# Patient Record
Sex: FEMALE | URBAN - METROPOLITAN AREA
[De-identification: names, ages, dates, MRNs, and addresses within clinical notes are randomized per-mention and may not be internally consistent; named-entity substitution may affect disease eponyms.]

---

## 2022-04-28 ENCOUNTER — APPOINTMENT (RX ONLY)
Dept: URBAN - METROPOLITAN AREA CLINIC 113 | Facility: CLINIC | Age: 77
Setting detail: DERMATOLOGY
End: 2022-04-28

## 2022-04-28 DIAGNOSIS — L72.0 EPIDERMAL CYST: ICD-10-CM

## 2022-04-28 DIAGNOSIS — D69.2 OTHER NONTHROMBOCYTOPENIC PURPURA: ICD-10-CM

## 2022-04-28 DIAGNOSIS — L08.9 LOCAL INFECTION OF THE SKIN AND SUBCUTANEOUS TISSUE, UNSPECIFIED: ICD-10-CM

## 2022-04-28 PROCEDURE — ? ADDITIONAL NOTES

## 2022-04-28 PROCEDURE — ? PRESCRIPTION MEDICATION MANAGEMENT

## 2022-04-28 PROCEDURE — 99203 OFFICE O/P NEW LOW 30 MIN: CPT

## 2022-04-28 PROCEDURE — ? COUNSELING

## 2022-04-28 PROCEDURE — ? PRESCRIPTION

## 2022-04-28 PROCEDURE — ? DIAGNOSIS COMMENT

## 2022-04-28 PROCEDURE — ? TREATMENT REGIMEN

## 2022-04-28 RX ORDER — MUPIROCIN 20 MG/G
OINTMENT TOPICAL
Qty: 15 | Refills: 0 | Status: ERX | COMMUNITY
Start: 2022-04-28

## 2022-04-28 RX ADMIN — MUPIROCIN: 20 OINTMENT TOPICAL at 00:00

## 2022-04-28 ASSESSMENT — LOCATION DETAILED DESCRIPTION DERM
LOCATION DETAILED: RIGHT LATERAL INFERIOR EYELID
LOCATION DETAILED: RIGHT MEDIAL SUPERIOR EYELID
LOCATION DETAILED: LEFT CENTRAL EYEBROW
LOCATION DETAILED: LEFT LATERAL INFERIOR PRESEPTAL REGION
LOCATION DETAILED: LEFT PROXIMAL DORSAL FOREARM
LOCATION DETAILED: RIGHT PROXIMAL DORSAL FOREARM
LOCATION DETAILED: LEFT LATERAL SUPERIOR EYELID

## 2022-04-28 ASSESSMENT — LOCATION SIMPLE DESCRIPTION DERM
LOCATION SIMPLE: LEFT FOREARM
LOCATION SIMPLE: LEFT SUPERIOR EYELID
LOCATION SIMPLE: RIGHT SUPERIOR EYELID
LOCATION SIMPLE: LEFT INFERIOR EYELID
LOCATION SIMPLE: RIGHT INFERIOR EYELID
LOCATION SIMPLE: RIGHT FOREARM
LOCATION SIMPLE: LEFT EYEBROW

## 2022-04-28 ASSESSMENT — LOCATION ZONE DERM
LOCATION ZONE: EYELID
LOCATION ZONE: ARM
LOCATION ZONE: FACE

## 2022-04-28 NOTE — PROCEDURE: DIAGNOSIS COMMENT
Comment: Secondary to patient ‘picking and digging’ at milia on eyelids. Strongly discouraged picking as it can lead to an infection, which in this case it has done. We will treat infection as directed and follow up as scheduled. If infection is cleared at follow up, discussed that I will treat any milia on exam.
Render Risk Assessment In Note?: no
Detail Level: Zone

## 2022-04-28 NOTE — PROCEDURE: ADDITIONAL NOTES
Additional Notes: Plan to extract when infection is clear
Detail Level: Simple
Render Risk Assessment In Note?: no

## 2022-04-28 NOTE — PROCEDURE: PRESCRIPTION MEDICATION MANAGEMENT
Detail Level: Zone
Render In Strict Bullet Format?: No
Initiate Treatment: -\\n- mupirocin 2% ointment: apply to affected areas twice daily for 2 weeks\\n- apply plain OTC Vaseline nightly before bed

## 2022-05-13 ENCOUNTER — APPOINTMENT (RX ONLY)
Dept: URBAN - METROPOLITAN AREA CLINIC 113 | Facility: CLINIC | Age: 77
Setting detail: DERMATOLOGY
End: 2022-05-13

## 2022-05-13 DIAGNOSIS — L72.0 EPIDERMAL CYST: ICD-10-CM | Status: RESOLVED

## 2022-05-13 DIAGNOSIS — L08.9 LOCAL INFECTION OF THE SKIN AND SUBCUTANEOUS TISSUE, UNSPECIFIED: ICD-10-CM | Status: IMPROVED

## 2022-05-13 PROCEDURE — 99213 OFFICE O/P EST LOW 20 MIN: CPT

## 2022-05-13 PROCEDURE — ? COUNSELING

## 2022-05-13 PROCEDURE — ? PRESCRIPTION

## 2022-05-13 PROCEDURE — ? PRESCRIPTION MEDICATION MANAGEMENT

## 2022-05-13 PROCEDURE — ? DIAGNOSIS COMMENT

## 2022-05-13 RX ORDER — MUPIROCIN 20 MG/G
OINTMENT TOPICAL
Qty: 15 | Refills: 0 | Status: ERX

## 2022-05-13 ASSESSMENT — LOCATION DETAILED DESCRIPTION DERM
LOCATION DETAILED: LEFT CENTRAL EYEBROW
LOCATION DETAILED: RIGHT MEDIAL SUPERIOR EYELID
LOCATION DETAILED: LEFT LATERAL INFERIOR PRESEPTAL REGION
LOCATION DETAILED: LEFT LATERAL SUPERIOR EYELID
LOCATION DETAILED: RIGHT LATERAL INFERIOR EYELID

## 2022-05-13 ASSESSMENT — LOCATION SIMPLE DESCRIPTION DERM
LOCATION SIMPLE: LEFT SUPERIOR EYELID
LOCATION SIMPLE: LEFT INFERIOR EYELID
LOCATION SIMPLE: RIGHT SUPERIOR EYELID
LOCATION SIMPLE: LEFT EYEBROW
LOCATION SIMPLE: RIGHT INFERIOR EYELID

## 2022-05-13 ASSESSMENT — LOCATION ZONE DERM
LOCATION ZONE: FACE
LOCATION ZONE: EYELID

## 2022-05-13 NOTE — PROCEDURE: PRESCRIPTION MEDICATION MANAGEMENT
Detail Level: Zone
Render In Strict Bullet Format?: No
Continue Regimen: -\\n- mupirocin 2% ointment: apply to affected areas twice daily for additional 2 weeks\\n- apply plain OTC Vaseline nightly before bed

## 2022-07-21 ENCOUNTER — APPOINTMENT (RX ONLY)
Dept: URBAN - METROPOLITAN AREA CLINIC 111 | Facility: CLINIC | Age: 77
Setting detail: DERMATOLOGY
End: 2022-07-21

## 2022-07-21 DIAGNOSIS — L83 ACANTHOSIS NIGRICANS: ICD-10-CM

## 2022-07-21 DIAGNOSIS — H02.40 UNSPECIFIED PTOSIS OF EYELID: ICD-10-CM

## 2022-07-21 DIAGNOSIS — L72.0 EPIDERMAL CYST: ICD-10-CM

## 2022-07-21 PROBLEM — H02.403 UNSPECIFIED PTOSIS OF BILATERAL EYELIDS: Status: ACTIVE | Noted: 2022-07-21

## 2022-07-21 PROCEDURE — ? DIAGNOSIS COMMENT

## 2022-07-21 PROCEDURE — 99213 OFFICE O/P EST LOW 20 MIN: CPT

## 2022-07-21 PROCEDURE — ? TREATMENT REGIMEN

## 2022-07-21 PROCEDURE — ? COUNSELING

## 2022-07-21 ASSESSMENT — LOCATION SIMPLE DESCRIPTION DERM
LOCATION SIMPLE: LEFT INFERIOR EYELID
LOCATION SIMPLE: LEFT EYEBROW
LOCATION SIMPLE: LEFT CHEEK
LOCATION SIMPLE: RIGHT INFERIOR EYELID
LOCATION SIMPLE: RIGHT CHEEK
LOCATION SIMPLE: RIGHT EYEBROW
LOCATION SIMPLE: SCALP

## 2022-07-21 ASSESSMENT — LOCATION DETAILED DESCRIPTION DERM
LOCATION DETAILED: LEFT CENTRAL EYEBROW
LOCATION DETAILED: RIGHT LATERAL INFERIOR EYELID
LOCATION DETAILED: LEFT SUPERIOR MEDIAL MALAR CHEEK
LOCATION DETAILED: RIGHT CENTRAL MALAR CHEEK
LOCATION DETAILED: LEFT LATERAL INFERIOR EYELID
LOCATION DETAILED: RIGHT CENTRAL EYEBROW
LOCATION DETAILED: LEFT CENTRAL OCCIPITAL SCALP

## 2022-07-21 ASSESSMENT — LOCATION ZONE DERM
LOCATION ZONE: SCALP
LOCATION ZONE: FACE
LOCATION ZONE: EYELID

## 2022-07-21 NOTE — PROCEDURE: TREATMENT REGIMEN
Detail Level: Zone
Plan: Recommended patient to see Dr.Sean Romero with [facility] ([phone]) for a blepharoplasty, this can help with the discoloration and drooping.

## 2022-07-21 NOTE — PROCEDURE: DIAGNOSIS COMMENT
Render Risk Assessment In Note?: no
Comment: Advised patient to stop using the magnifying mirror.
Detail Level: Simple

## 2023-01-18 ENCOUNTER — APPOINTMENT (RX ONLY)
Dept: URBAN - METROPOLITAN AREA CLINIC 111 | Facility: CLINIC | Age: 78
Setting detail: DERMATOLOGY
End: 2023-01-18

## 2023-01-18 DIAGNOSIS — H01.13 ECZEMATOUS DERMATITIS OF EYELID: ICD-10-CM

## 2023-01-18 DIAGNOSIS — Z41.9 ENCOUNTER FOR PROCEDURE FOR PURPOSES OTHER THAN REMEDYING HEALTH STATE, UNSPECIFIED: ICD-10-CM

## 2023-01-18 DIAGNOSIS — L70.8 OTHER ACNE: ICD-10-CM

## 2023-01-18 PROBLEM — H01.139 ECZEMATOUS DERMATITIS OF UNSPECIFIED EYE, UNSPECIFIED EYELID: Status: ACTIVE | Noted: 2023-01-18

## 2023-01-18 PROCEDURE — ? PHOTO-DOCUMENTATION

## 2023-01-18 PROCEDURE — ? COUNSELING

## 2023-01-18 PROCEDURE — 99214 OFFICE O/P EST MOD 30 MIN: CPT

## 2023-01-18 PROCEDURE — ? PRESCRIPTION MEDICATION MANAGEMENT

## 2023-01-18 PROCEDURE — ? DIAGNOSIS COMMENT

## 2023-01-18 PROCEDURE — ? PRESCRIPTION

## 2023-01-18 RX ORDER — TACROLIMUS 1 MG/G
OINTMENT TOPICAL
Qty: 30 | Refills: 1 | Status: ERX | COMMUNITY
Start: 2023-01-18

## 2023-01-18 RX ORDER — TRETIONIN 0.25 MG/G
CREAM TOPICAL
Qty: 20 | Refills: 3 | Status: ERX | COMMUNITY
Start: 2023-01-18

## 2023-01-18 RX ADMIN — TACROLIMUS: 1 OINTMENT TOPICAL at 00:00

## 2023-01-18 RX ADMIN — TRETIONIN: 0.25 CREAM TOPICAL at 00:00

## 2023-01-18 ASSESSMENT — LOCATION DETAILED DESCRIPTION DERM
LOCATION DETAILED: LEFT CENTRAL EYEBROW
LOCATION DETAILED: RIGHT INFERIOR MEDIAL FOREHEAD

## 2023-01-18 ASSESSMENT — LOCATION SIMPLE DESCRIPTION DERM
LOCATION SIMPLE: RIGHT FOREHEAD
LOCATION SIMPLE: LEFT EYEBROW

## 2023-01-18 ASSESSMENT — LOCATION ZONE DERM: LOCATION ZONE: FACE

## 2023-01-18 NOTE — PROCEDURE: PRESCRIPTION MEDICATION MANAGEMENT
Detail Level: Zone
Initiate Treatment: -\\n\\nCerave Hydrating Cleanser \\n\\nTretinoin 0.025 % topical cream : Apply a pea-sized amount in a thin layer to entire face nightly. Can start every 3rd night and increase to nightly as tolerated.\\n\\nMoisturize daily with Cerave moisturizing cream
Render In Strict Bullet Format?: Yes
Initiate Treatment: -\\n\\nTacrolimus 0.1 % topical ointment : Apply to affected areas twice daily as needed for flares. May mix with CeraVe Healing Ointment or refrigerate if stinging occurs upon application.
Discontinue Regimen: -
Plan: Follow up in a month to re evaluate for milia

## 2023-01-18 NOTE — HPI: SKIN LESION
Is This A New Presentation, Or A Follow-Up?: Skin Lesions
What Type Of Note Output Would You Prefer (Optional)?: Bullet Format
How Severe Is Your Skin Lesion?: moderate
Additional History: \\n\\n\\nPatient would like treatment options for milia, she has only tried an OTC extraction which was not effective. She also notes emergence of new fine lines since trying to do extractions hersel

## 2023-01-18 NOTE — PROCEDURE: DIAGNOSIS COMMENT
Comment: Recommended Skin Better Eyemax for around the eyes, patient can begin to use after rash calms down.
Detail Level: Simple
Render Risk Assessment In Note?: no

## 2023-02-16 ENCOUNTER — APPOINTMENT (RX ONLY)
Dept: URBAN - METROPOLITAN AREA CLINIC 111 | Facility: CLINIC | Age: 78
Setting detail: DERMATOLOGY
End: 2023-02-16

## 2023-02-16 DIAGNOSIS — H01.13 ECZEMATOUS DERMATITIS OF EYELID: ICD-10-CM

## 2023-02-16 PROBLEM — H01.139 ECZEMATOUS DERMATITIS OF UNSPECIFIED EYE, UNSPECIFIED EYELID: Status: ACTIVE | Noted: 2023-02-16

## 2023-02-16 PROCEDURE — ? COUNSELING

## 2023-02-16 PROCEDURE — 99213 OFFICE O/P EST LOW 20 MIN: CPT

## 2023-02-16 PROCEDURE — ? PRESCRIPTION MEDICATION MANAGEMENT

## 2023-02-16 ASSESSMENT — LOCATION SIMPLE DESCRIPTION DERM: LOCATION SIMPLE: LEFT EYEBROW

## 2023-02-16 ASSESSMENT — LOCATION DETAILED DESCRIPTION DERM: LOCATION DETAILED: LEFT CENTRAL EYEBROW

## 2023-02-16 ASSESSMENT — LOCATION ZONE DERM: LOCATION ZONE: FACE

## 2023-02-16 NOTE — PROCEDURE: PRESCRIPTION MEDICATION MANAGEMENT
Continue Regimen: -\\n\\nTacrolimus 0.1 % topical ointment : Apply to affected areas twice daily as needed for flares. May mix with CeraVe Healing Ointment or refrigerate if stinging occurs upon application.
Discontinue Regimen: — Retinal on eyelids
Plan: - discussed Patch testing if this does not resolve completely
Render In Strict Bullet Format?: Yes
Detail Level: Zone
no concerns

## 2024-06-18 ENCOUNTER — APPOINTMENT (RX ONLY)
Dept: URBAN - METROPOLITAN AREA CLINIC 111 | Facility: CLINIC | Age: 79
Setting detail: DERMATOLOGY
End: 2024-06-18

## 2024-06-18 DIAGNOSIS — L72.0 EPIDERMAL CYST: ICD-10-CM

## 2024-06-18 DIAGNOSIS — L259 CONTACT DERMATITIS AND OTHER ECZEMA, UNSPECIFIED CAUSE: ICD-10-CM | Status: INADEQUATELY CONTROLLED

## 2024-06-18 PROBLEM — L23.9 ALLERGIC CONTACT DERMATITIS, UNSPECIFIED CAUSE: Status: ACTIVE | Noted: 2024-06-18

## 2024-06-18 PROCEDURE — ? PRESCRIPTION MEDICATION MANAGEMENT

## 2024-06-18 PROCEDURE — ? PRESCRIPTION

## 2024-06-18 PROCEDURE — ? COUNSELING

## 2024-06-18 PROCEDURE — ? DIAGNOSIS COMMENT

## 2024-06-18 PROCEDURE — 99214 OFFICE O/P EST MOD 30 MIN: CPT

## 2024-06-18 RX ORDER — METHYLPREDNISOLONE 4 MG/1
TABLET ORAL
Qty: 1 | Refills: 0 | Status: ERX | COMMUNITY
Start: 2024-06-18

## 2024-06-18 RX ORDER — HYDROCORTISONE 25 MG/G
CREAM TOPICAL
Qty: 30 | Refills: 0 | Status: ERX | COMMUNITY
Start: 2024-06-18

## 2024-06-18 RX ADMIN — METHYLPREDNISOLONE: 4 TABLET ORAL at 00:00

## 2024-06-18 RX ADMIN — HYDROCORTISONE: 25 CREAM TOPICAL at 00:00

## 2024-06-18 ASSESSMENT — LOCATION DETAILED DESCRIPTION DERM
LOCATION DETAILED: LEFT INFERIOR CENTRAL MALAR CHEEK
LOCATION DETAILED: LEFT CENTRAL MALAR CHEEK
LOCATION DETAILED: LEFT INFERIOR ANTERIOR NECK
LOCATION DETAILED: RIGHT INFERIOR MEDIAL MALAR CHEEK
LOCATION DETAILED: RIGHT MEDIAL MALAR CHEEK

## 2024-06-18 ASSESSMENT — LOCATION SIMPLE DESCRIPTION DERM
LOCATION SIMPLE: LEFT ANTERIOR NECK
LOCATION SIMPLE: LEFT CHEEK
LOCATION SIMPLE: RIGHT CHEEK

## 2024-06-18 ASSESSMENT — LOCATION ZONE DERM
LOCATION ZONE: NECK
LOCATION ZONE: FACE

## 2024-06-18 NOTE — PROCEDURE: DIAGNOSIS COMMENT
Render Risk Assessment In Note?: no
Detail Level: Simple
Comment: Recommend extraction with Darya.
Comment: Likely allergic reaction to a new product.

## 2024-06-18 NOTE — PROCEDURE: PRESCRIPTION MEDICATION MANAGEMENT
Render In Strict Bullet Format?: Yes
Detail Level: Zone
Initiate Treatment: -\\n- was face twice daily with CeraVe gentle cleanser \\n- apply CeraVe healing ointment at night\\n- OTC Benadryl nightly\\n- hydrocortisone 2.5 % topical cream: Apply to affected areas on face and neck twice daily for 2 weeks on, 1 week off. Repeat prn for flares.\\n- Medrol (Jose) 4 mg tablets in a dose pack: Take per package instructions.

## 2024-07-25 ENCOUNTER — APPOINTMENT (RX ONLY)
Dept: URBAN - METROPOLITAN AREA CLINIC 111 | Facility: CLINIC | Age: 79
Setting detail: DERMATOLOGY
End: 2024-07-25

## 2024-07-25 DIAGNOSIS — L259 CONTACT DERMATITIS AND OTHER ECZEMA, UNSPECIFIED CAUSE: ICD-10-CM | Status: IMPROVED

## 2024-07-25 PROBLEM — L23.9 ALLERGIC CONTACT DERMATITIS, UNSPECIFIED CAUSE: Status: ACTIVE | Noted: 2024-07-25

## 2024-07-25 PROCEDURE — ? COUNSELING

## 2024-07-25 PROCEDURE — 99213 OFFICE O/P EST LOW 20 MIN: CPT

## 2024-07-25 PROCEDURE — ? PRESCRIPTION MEDICATION MANAGEMENT

## 2024-07-25 PROCEDURE — ? DIAGNOSIS COMMENT

## 2024-07-25 ASSESSMENT — LOCATION DETAILED DESCRIPTION DERM
LOCATION DETAILED: LEFT INFERIOR ANTERIOR NECK
LOCATION DETAILED: LEFT INFERIOR CENTRAL MALAR CHEEK
LOCATION DETAILED: RIGHT INFERIOR MEDIAL MALAR CHEEK

## 2024-07-25 ASSESSMENT — LOCATION ZONE DERM
LOCATION ZONE: NECK
LOCATION ZONE: FACE

## 2024-07-25 ASSESSMENT — LOCATION SIMPLE DESCRIPTION DERM
LOCATION SIMPLE: LEFT ANTERIOR NECK
LOCATION SIMPLE: RIGHT CHEEK
LOCATION SIMPLE: LEFT CHEEK

## 2024-07-25 NOTE — PROCEDURE: PRESCRIPTION MEDICATION MANAGEMENT
Render In Strict Bullet Format?: Yes
Continue Regimen: -\\n- was face twice daily with CeraVe gentle cleanser \\n- apply CeraVe healing ointment at night\\n- OTC Benadryl nightly\\n- hydrocortisone 2.5 % topical cream: Apply to affected areas on face and neck twice daily for 2 weeks on, 1 week off. Repeat prn for flares.\\n- Medrol (Jose) 4 mg tablets in a dose pack: Take per package instructions.
Detail Level: Zone

## 2024-07-25 NOTE — PROCEDURE: DIAGNOSIS COMMENT
Render Risk Assessment In Note?: no
Comment: Patient present with no flaring today. Offered patient to do patch testing in the event that she begins to flare.
Detail Level: Simple

## 2024-08-22 ENCOUNTER — APPOINTMENT (RX ONLY)
Dept: URBAN - METROPOLITAN AREA CLINIC 111 | Facility: CLINIC | Age: 79
Setting detail: DERMATOLOGY
End: 2024-08-22

## 2024-08-22 DIAGNOSIS — Z41.9 ENCOUNTER FOR PROCEDURE FOR PURPOSES OTHER THAN REMEDYING HEALTH STATE, UNSPECIFIED: ICD-10-CM

## 2024-08-22 PROCEDURE — ? DIAGNOSIS COMMENT

## 2024-08-22 NOTE — PROCEDURE: DIAGNOSIS COMMENT
Comment: Patient made appointment for milia extractions but she had no visible milia.  Her skin was peeling and extremely dry due to over use of Adapalene.  She stopped using it a few nights prior.  Instructed patient to use gentle cleansers and CeraVe moisturizer and to avoid anything harsh until skin is healed.  \\nPatient said she had patch testing at a different location in the past and she will bring us her list of allergens.
Render Risk Assessment In Note?: no
Detail Level: Simple

## 2024-09-06 ENCOUNTER — APPOINTMENT (RX ONLY)
Dept: URBAN - METROPOLITAN AREA CLINIC 111 | Facility: CLINIC | Age: 79
Setting detail: DERMATOLOGY
End: 2024-09-06

## 2024-09-06 DIAGNOSIS — L259 CONTACT DERMATITIS AND OTHER ECZEMA, UNSPECIFIED CAUSE: ICD-10-CM

## 2024-09-06 PROBLEM — L23.9 ALLERGIC CONTACT DERMATITIS, UNSPECIFIED CAUSE: Status: ACTIVE | Noted: 2024-09-06

## 2024-09-06 PROCEDURE — ? VISIT COMPLEXITY

## 2024-09-06 PROCEDURE — ? PRESCRIPTION MEDICATION MANAGEMENT

## 2024-09-06 PROCEDURE — ? COUNSELING

## 2024-09-06 PROCEDURE — ? DIAGNOSIS COMMENT

## 2024-09-06 PROCEDURE — 99213 OFFICE O/P EST LOW 20 MIN: CPT | Mod: 25

## 2024-09-06 PROCEDURE — ? PATCH TESTING

## 2024-09-06 PROCEDURE — 95044 PATCH/APPLICATION TESTS: CPT

## 2024-09-06 PROCEDURE — ? SEPARATE AND IDENTIFIABLE DOCUMENTATION

## 2024-09-06 ASSESSMENT — LOCATION ZONE DERM
LOCATION ZONE: NECK
LOCATION ZONE: FACE

## 2024-09-06 ASSESSMENT — LOCATION SIMPLE DESCRIPTION DERM
LOCATION SIMPLE: RIGHT CHEEK
LOCATION SIMPLE: LEFT ANTERIOR NECK
LOCATION SIMPLE: LEFT CHEEK

## 2024-09-06 ASSESSMENT — BSA RASH: BSA RASH: 10

## 2024-09-06 ASSESSMENT — SEVERITY ASSESSMENT: SEVERITY: SEVERE

## 2024-09-06 ASSESSMENT — LOCATION DETAILED DESCRIPTION DERM
LOCATION DETAILED: RIGHT INFERIOR MEDIAL MALAR CHEEK
LOCATION DETAILED: LEFT INFERIOR ANTERIOR NECK
LOCATION DETAILED: LEFT INFERIOR CENTRAL MALAR CHEEK

## 2024-09-06 NOTE — PROCEDURE: DIAGNOSIS COMMENT
Render Risk Assessment In Note?: no
Comment: Patient continues to flare and rash is c/w contact dermatitis. Recommended patch testing. Applying today and will have first reading Monday with LD and second reading Wednesday with AAM.
Detail Level: Simple

## 2024-09-06 NOTE — PROCEDURE: PATCH TESTING
Detail Level: Zone
Consent: Written consent obtained, risks reviewed including but not limited to rash, itching, allergic reaction, systemic rash, remote possiblity of anaphylaxis to allergen.
Post-Care Instructions: PATIENT INSTRUCTIONS\\nPatch Testing\\n\\nPatch Testing Process\\n    • You are being patch tested to determine if your rash is caused by an allergic reaction to specific substances (allergens such as chemicals, minerals, or medications) that may have come into contact with your skin. \\n    • We have tested the most common allergens in [place]; since the test cannot be comprehensive, not everyone will get conclusive results. \\n    • The test process will involve at least two visits to the clinic after the test is applied, to look for various degrees of redness, swelling, or even blistering at the test sites.\\nWhat to Expect\\n    • We have applied strips of tape with small amounts of suspect substances onto your back. This may occasionally feel uncomfortable, and you may develop itching under one or more of the chambers. \\n    • Try to avoid scratching, as itching is normally an indication of a positive reaction and scratching might alter the test results. If pain occurs, call the clinic. \\n    • Keep the area dry until the patches are removed in 2-3 days. You may take a sponge bath, but DO NOT get your back wet. \\n    • Avoid heavy and physical exercises that could cause excessive perspiration and detachment of the test unit. \\n    • If you notice patches peeling or loosening from the skin, have someone apply pressure to the adhesive portion of the strips. If necessary, you can also apply additional tape to the edges of the chamber units. \\n    • Abstain from taking oral or injected steroid medications; these may invalidate the test. \\n    • Avoid prolonged sun exposure to the test area.\\nNext Steps\\n    • The chamber units will be removed after about 2 days for the initial reading to assess early reactions at the test sites and enriqueta them with ink. \\n    • A second reading will be scheduled in about 2 additional days to assess final test results. \\n    • Continue to watch the test area over the next week in the event there is a delayed reaction on the skin.\\n**NOTE:  Surgical marker used on patch test may transfer to clothing or sheets.\\nResults\\n    • Upon completion of this procedure, you will be provided with written results and advised of any skin allergies you may have. \\n    • We will provide you with the various names of the positive allergens and where to find them in your environment. \\n    • Isolating your skin allergens and avoiding exposure to them can help reduce the risk of future allergic reactions. \\n    • Return to the clinic if your skin condition is persistent or recurrent, and your doctor will provide treatment options, as needed.\\n\\nRevised 7/1/2015
Number Of Patches (Maximum Allowable Per Dos By Cms Is 90): 80

## 2024-09-06 NOTE — PROCEDURE: PRESCRIPTION MEDICATION MANAGEMENT
Discontinue Regimen: -\\n- [employer]uet[employer] sunscreen
Render In Strict Bullet Format?: Yes
Continue Regimen: -\\n- Wash face twice daily with CeraVe gentle cleanser \\n- Apply CeraVe healing ointment at night\\n- OTC Benadryl nightly\\n- hydrocortisone 2.5 % topical cream: Apply to affected areas on face and neck twice daily for 2 weeks on, 1 week off. Repeat prn for flares.
Detail Level: Zone
Initiate Treatment: -\\n- Elta MD Pure

## 2024-09-09 ENCOUNTER — APPOINTMENT (RX ONLY)
Dept: URBAN - METROPOLITAN AREA CLINIC 111 | Facility: CLINIC | Age: 79
Setting detail: DERMATOLOGY
End: 2024-09-09

## 2024-09-09 DIAGNOSIS — L259 CONTACT DERMATITIS AND OTHER ECZEMA, UNSPECIFIED CAUSE: ICD-10-CM

## 2024-09-09 PROBLEM — L23.9 ALLERGIC CONTACT DERMATITIS, UNSPECIFIED CAUSE: Status: ACTIVE | Noted: 2024-09-09

## 2024-09-09 PROCEDURE — 99212 OFFICE O/P EST SF 10 MIN: CPT

## 2024-09-09 PROCEDURE — ? COUNSELING

## 2024-09-09 PROCEDURE — ? NORTH AMERICAN 80 PATCH TEST READING

## 2024-09-09 NOTE — PROCEDURE: NORTH AMERICAN 80 PATCH TEST READING
Show Allergen Counseling In The Note?: No
Allergen 32 Reaction: no reaction
Name Of Allergen 57: Propyl gallate
Name Of Allergen 19: Toluene-2,5-diamine sulfate / (2,5-Diaminotoluene sulfate)
Name Of Allergen 45: Fragrance mix
Name Of Allergen 51: Textile dye mix II (Temporarily unavailable)
Name Of Allergen 27: Ethylenediamine dihydrochloride
Name Of Allergen 76: Dimethylaminopropylamine
Allergen 57 Reaction: irritant reaction
Name Of Allergen 7: Benzyl salicylate
Name Of Allergen 33: N-Isopropyl-N-phenyl--4-phenylenediamine
Name Of Allergen 13: Chloroxylenol (PCMX) / (4-Chloro-3,5-xylenol (PCMX))
Name Of Allergen 39: Methyl methacrylate
Name Of Allergen 64: sodium metabisulfite
Name Of Allergen 1: Amerchol L 101
Name Of Allergen 70: Cananga odorata oil / (Ylang-Ylang oil)
Name Of Allergen 58: Propolis
Name Of Allergen 52: Neomycin sulfate
Name Of Allergen 14: Cinnamal / (Cinnamic aldehyde)
Name Of Allergen 46: Sesquiterpene lactone mix
Name Of Allergen 20: N,N-Diphenylguanidine
Allergen 58 Reaction: +/-
Name Of Allergen 28: HEMA (2-Hydroxyethyl methacrylate)
Name Of Allergen 34: Iodopropynyl butyl carbamate
Name Of Allergen 2: Ammonium persulfate
Name Of Allergen 40: Thiuram mix
Name Of Allergen 77: Formaldehyde
Name Of Allergen 65: Toluenesulfonamide formaldehyde resin
Name Of Allergen 8: 2-Bromo-2-nitropropane-l,3-diol (Bronopol)
Name Of Allergen 71: Amidoamine
Name Of Allergen 59: Polymyxin B sulfate
Name Of Allergen 53: Nickelsulfate hexahydrate
Name Of Allergen 47: Vadim mix III
Name Of Allergen 9: 8-kurf-Yrmclpbefcdimfukvywydvd resin
Name Of Allergen 67: Tea Tree Oil
Name Of Allergen 21: Diazolidinylurea (Germall II)
Name Of Allergen 78: METHYLISOTHIAZOLINONE
Name Of Allergen 15: Cobalt(II) chloride hexahydrate
Name Of Allergen 41: Paraben Mix
Name Of Allergen 66: Tixocortol-21-pivalate
Name Of Allergen 35: Lidocaine
Name Of Allergen 72: BENZALKONIUM CHLORIDE
Name Of Allergen 3: Myroxylon pereaydeee resin / (Balsam [place])
Name Of Allergen 29: Benzophenone-4 / (2-Hydroxy-4-methoxy-benzophenone-5-sulfonic acid)
Name Of Allergen 54: 2-n-Octyl-4-isothiazolin-3-one
Name Of Allergen 22: DMDM Hydantoin
Name Of Allergen 60: Pramoxine hydrochloride
Name Of Allergen 16: Cocamide BETTY / (Coconut diethanolamide)
Name Of Allergen 48: Mixed dialkyl thiourea
Name Of Allergen 73: Chlorhexidine digluconate
Detail Level: Zone
Name Of Allergen 10: Bacitracin
Name Of Allergen 79: Oleamidopropyl dimethylamine
Name Of Allergen 4: BENZISOTHIAZOLINONE (temporarily unavailable)
Name Of Allergen 42: Black rubber mix
Name Of Allergen 36: Hydroxyisohexyl 3-Cyclohexene Carboxaldehyde (Lyral) (Temporarily unavailable)
Name Of Allergen 30: Hydroperoxides of Linalool
Name Of Allergen 55: p-Phenylenediamine (PPD
Name Of Allergen 61: Sodium benzoate
Name Of Allergen 17: Colophonium / (Colophony)
What Reading Time Point?: 48 hour
Name Of Allergen 23: Methyldibromo glutaronitrile (MDBGN)
Name Of Allergen 43: Mercapto mix
Name Of Allergen 49: Fragrance Mix II
Name Of Allergen 25: Epoxy resin Bisphenol A
Name Of Allergen 74: Methylisothiazolinone+Methylchloroisothiazolinone Methychloro-methyl-isothiazolinone ( VASILE CG
Name Of Allergen 37: lauryl polyglucose
Name Of Allergen 80: Propylene glycol
Name Of Allergen 11: B-033A Budesonide
Name Of Allergen 68: Tocopherol/ (DL alpha tocopherol)
Name Of Allergen 5: Benzocaine
Name Of Allergen 31: Hydroperoxides of Limonene
Name Of Allergen 56: Potassium dichromate
Name Of Allergen 62: Sorbitan oleate / (Sorbitan monooleate (Span 80))
Number Of Patches Read: 8
Name Of Allergen 24: Decyl Glucoside
Name Of Allergen 44: Carba mix
Name Of Allergen 12: Quaternium-15 (Dowicil 200) / (1-(3-Chloroallyl)-3,5,9-xlzwmf-3-azoniaadamantane chloride)
Name Of Allergen 50: Compositae Mix II
Name Of Allergen 38: 2-Mercaptobenzothiazole (MBT)
Name Of Allergen 75: Cocamidopropylbetaine
Name Of Allergen 26: Ethyl acrylate
Name Of Allergen 18: Clobetasol-17-propionate
Name Of Allergen 32: Imidazolidinyl urea Germall 115)
Name Of Allergen 69: Lanolin alcohol / (Wool alcohols)
Name Of Allergen 6: Benzyl alcohol
Name Of Allergen 63: Sorbitan sesquioleate

## 2024-09-11 ENCOUNTER — APPOINTMENT (RX ONLY)
Dept: URBAN - METROPOLITAN AREA CLINIC 111 | Facility: CLINIC | Age: 79
Setting detail: DERMATOLOGY
End: 2024-09-11

## 2024-09-11 DIAGNOSIS — L259 CONTACT DERMATITIS AND OTHER ECZEMA, UNSPECIFIED CAUSE: ICD-10-CM

## 2024-09-11 PROBLEM — L23.9 ALLERGIC CONTACT DERMATITIS, UNSPECIFIED CAUSE: Status: ACTIVE | Noted: 2024-09-11

## 2024-09-11 PROCEDURE — ? DIAGNOSIS COMMENT

## 2024-09-11 PROCEDURE — 99212 OFFICE O/P EST SF 10 MIN: CPT | Mod: 25

## 2024-09-11 PROCEDURE — 96372 THER/PROPH/DIAG INJ SC/IM: CPT

## 2024-09-11 PROCEDURE — ? NORTH AMERICAN 80 PATCH TEST READING

## 2024-09-11 PROCEDURE — ? COUNSELING

## 2024-09-11 PROCEDURE — ? INTRAMUSCULAR KENALOG

## 2024-09-11 PROCEDURE — ? SEPARATE AND IDENTIFIABLE DOCUMENTATION

## 2024-09-11 ASSESSMENT — LOCATION SIMPLE DESCRIPTION DERM: LOCATION SIMPLE: RIGHT BUTTOCK

## 2024-09-11 ASSESSMENT — LOCATION ZONE DERM: LOCATION ZONE: TRUNK

## 2024-09-11 ASSESSMENT — LOCATION DETAILED DESCRIPTION DERM: LOCATION DETAILED: RIGHT BUTTOCK

## 2024-09-11 NOTE — PROCEDURE: INTRAMUSCULAR KENALOG
Expiration Date (Optional): 05/2026
Kenalog Preparation: kenalog
Concentration (Mg/Ml): 40.0
Add Option For Additional Mediation: No
Concentration (Mg/Ml) Of Additional Medication: 2.5
Consent: Reviewed risks of IM Kenalog including weight gain, hunger, diabetes, increased blood sugars or blood pressure, osteoporosis, ulcers, sleeplessness, and psychiatric disturbance.
Lot # (Optional): [id number]
Total Volume (Ccs): 1.5
Detail Level: None

## 2024-09-11 NOTE — PROCEDURE: NORTH AMERICAN 80 PATCH TEST READING
Show Allergen Counseling In The Note?: No
Allergen 32 Reaction: no reaction
Name Of Allergen 57: Propyl gallate
Name Of Allergen 19: Toluene-2,5-diamine sulfate / (2,5-Diaminotoluene sulfate)
Name Of Allergen 45: Fragrance mix
Name Of Allergen 51: Textile dye mix II (Temporarily unavailable)
Name Of Allergen 27: Ethylenediamine dihydrochloride
Name Of Allergen 76: Dimethylaminopropylamine
Allergen 57 Reaction: 2+
Name Of Allergen 7: Benzyl salicylate
Name Of Allergen 33: N-Isopropyl-N-phenyl--4-phenylenediamine
Name Of Allergen 13: Chloroxylenol (PCMX) / (4-Chloro-3,5-xylenol (PCMX))
Name Of Allergen 39: Methyl methacrylate
Name Of Allergen 64: sodium metabisulfite
Name Of Allergen 1: Amerchol L 101
Name Of Allergen 70: Cananga odorata oil / (Ylang-Ylang oil)
Name Of Allergen 58: Propolis
Name Of Allergen 52: Neomycin sulfate
Name Of Allergen 14: Cinnamal / (Cinnamic aldehyde)
Name Of Allergen 46: Sesquiterpene lactone mix
Name Of Allergen 20: N,N-Diphenylguanidine
Name Of Allergen 28: HEMA (2-Hydroxyethyl methacrylate)
Name Of Allergen 34: Iodopropynyl butyl carbamate
Name Of Allergen 2: Ammonium persulfate
Name Of Allergen 40: Thiuram mix
Name Of Allergen 77: Formaldehyde
Name Of Allergen 65: Toluenesulfonamide formaldehyde resin
Name Of Allergen 8: 2-Bromo-2-nitropropane-l,3-diol (Bronopol)
Name Of Allergen 71: Amidoamine
Name Of Allergen 59: Polymyxin B sulfate
Name Of Allergen 53: Nickelsulfate hexahydrate
Name Of Allergen 47: Vadim mix III
Name Of Allergen 9: 3-ukzr-Exmqkaqpstgpxcfgetagkze resin
Name Of Allergen 67: Tea Tree Oil
Name Of Allergen 21: Diazolidinylurea (Germall II)
Name Of Allergen 78: METHYLISOTHIAZOLINONE
Name Of Allergen 15: Cobalt(II) chloride hexahydrate
Name Of Allergen 41: Paraben Mix
Name Of Allergen 66: Tixocortol-21-pivalate
Name Of Allergen 35: Lidocaine
Name Of Allergen 72: BENZALKONIUM CHLORIDE
Name Of Allergen 3: Myroxylon pereaydeee resin / (Balsam [place])
Name Of Allergen 29: Benzophenone-4 / (2-Hydroxy-4-methoxy-benzophenone-5-sulfonic acid)
Name Of Allergen 54: 2-n-Octyl-4-isothiazolin-3-one
Name Of Allergen 22: DMDM Hydantoin
Name Of Allergen 60: Pramoxine hydrochloride
Name Of Allergen 16: Cocamide BETTY / (Coconut diethanolamide)
Name Of Allergen 48: Mixed dialkyl thiourea
Name Of Allergen 73: Chlorhexidine digluconate
Detail Level: Zone
Name Of Allergen 10: Bacitracin
Name Of Allergen 79: Oleamidopropyl dimethylamine
Name Of Allergen 4: BENZISOTHIAZOLINONE (temporarily unavailable)
Name Of Allergen 42: Black rubber mix
Name Of Allergen 36: Hydroxyisohexyl 3-Cyclohexene Carboxaldehyde (Lyral) (Temporarily unavailable)
Name Of Allergen 30: Hydroperoxides of Linalool
Name Of Allergen 55: p-Phenylenediamine (PPD
Name Of Allergen 61: Sodium benzoate
Name Of Allergen 17: Colophonium / (Colophony)
What Reading Time Point?: 48 hour
Name Of Allergen 23: Methyldibromo glutaronitrile (MDBGN)
Name Of Allergen 43: Mercapto mix
Name Of Allergen 49: Fragrance Mix II
Name Of Allergen 25: Epoxy resin Bisphenol A
Name Of Allergen 74: Methylisothiazolinone+Methylchloroisothiazolinone Methychloro-methyl-isothiazolinone ( VASILE CG
Name Of Allergen 37: lauryl polyglucose
Name Of Allergen 80: Propylene glycol
Name Of Allergen 11: B-033A Budesonide
Name Of Allergen 68: Tocopherol/ (DL alpha tocopherol)
Name Of Allergen 5: Benzocaine
Name Of Allergen 31: Hydroperoxides of Limonene
Name Of Allergen 56: Potassium dichromate
Name Of Allergen 62: Sorbitan oleate / (Sorbitan monooleate (Span 80))
Number Of Patches Read: 8
Name Of Allergen 24: Decyl Glucoside
Name Of Allergen 44: Carba mix
Name Of Allergen 12: Quaternium-15 (Dowicil 200) / (1-(3-Chloroallyl)-3,5,5-qiybzj-4-azoniaadamantane chloride)
Name Of Allergen 50: Compositae Mix II
Name Of Allergen 38: 2-Mercaptobenzothiazole (MBT)
Name Of Allergen 75: Cocamidopropylbetaine
Name Of Allergen 26: Ethyl acrylate
Name Of Allergen 18: Clobetasol-17-propionate
Name Of Allergen 32: Imidazolidinyl urea Germall 115)
Name Of Allergen 69: Lanolin alcohol / (Wool alcohols)
Name Of Allergen 6: Benzyl alcohol
Name Of Allergen 63: Sorbitan sesquioleate